# Patient Record
Sex: MALE | Race: WHITE | NOT HISPANIC OR LATINO | URBAN - METROPOLITAN AREA
[De-identification: names, ages, dates, MRNs, and addresses within clinical notes are randomized per-mention and may not be internally consistent; named-entity substitution may affect disease eponyms.]

---

## 2018-10-17 ENCOUNTER — OFFICE VISIT CONVERTED (OUTPATIENT)
Dept: FAMILY MEDICINE CLINIC | Facility: CLINIC | Age: 28
End: 2018-10-17
Attending: NURSE PRACTITIONER

## 2021-05-16 VITALS
TEMPERATURE: 98.5 F | OXYGEN SATURATION: 97 % | HEART RATE: 84 BPM | DIASTOLIC BLOOD PRESSURE: 80 MMHG | SYSTOLIC BLOOD PRESSURE: 140 MMHG | HEIGHT: 74 IN | RESPIRATION RATE: 14 BRPM | BODY MASS INDEX: 22.34 KG/M2 | WEIGHT: 174.06 LBS

## 2025-06-23 ENCOUNTER — OFFICE VISIT (OUTPATIENT)
Dept: UROLOGY | Age: 35
End: 2025-06-23
Payer: COMMERCIAL

## 2025-06-23 VITALS — BODY MASS INDEX: 22.33 KG/M2 | WEIGHT: 174 LBS | HEIGHT: 74 IN

## 2025-06-23 DIAGNOSIS — Z30.09 STERILIZATION CONSULT: Primary | ICD-10-CM

## 2025-06-23 PROCEDURE — 99203 OFFICE O/P NEW LOW 30 MIN: CPT | Performed by: UROLOGY

## 2025-06-23 NOTE — PROGRESS NOTES
Chief Complaint: Undesired fertility         History of Present Illness:  Elton Powell is a 35 y.o. male presents for counseling regarding vasectomy for permanent sterilization. The procedure was discussed with the patient. Elton Powell is aware that the procedure should be considered irreversible, although at times it can be reversed with success. Risks for the procedure including local effects of swelling, bleeding, pain, the possibility for recanalization, and continued fertility is also possible. Formation of a sperm granuloma also is a possibility. We discussed the procedure, preoperative preparation, and postoperative care. We also discussed the importance of continuing to use contraception post vasectomy, since the patient will not be sterile at that point. We stressed the importance of continuing to use contraception until a follow-up semen specimen is done that shows the absence of sperm. That specimen will normally be obtained eight weeks post-surgery. Elton Powell is voluntarily requesting the procedure and understands that if it is successful he will be unable to father children.     No anticoagulation, no previous scrotal surgery, no cardiopulmonary history    Alert and oriented x3  No acute distress  Unlabored respirations  Nontender/nondistended  Normal circumcised phallus, bilateral descended testicles without mass.  Bilateral vas deferens palpable  Grossly oriented to person place and time judgment is intact      Assessment and Plan      Consult for sterilization      Plan    Instructions  The patient will schedule a vasectomy if he desires.   Handouts were provided, vasectomy  scanned into the EMR for reference.   Vasectomy is intended to be a permanent form of contraception.   Vasectomy does not produce immediate sterility.   Following vasectomy, another form of contraception is required until vas occlusion is confirmed by post-vasectomy semen analysis (PVSA).   Even  after vas occlusion is confirmed, vasectomy is not 100% reliable in preventing pregnancy.   The risk of pregnancy after vasectomy is approximately 1 in 2,000 for men who have no sperm in the semen on post-vasectomy semen analysis showing rare non-motile sperm (RNMS).   Repeat vasectomy is necessary in <1% of vasectomies, provided that a technique for vas occlusion known to have low occlusive failure rate has been used.   Patient's should refrain from ejaculation for approximately 1 week after vasectomy.   Options for fertility after vasectomy reversal and sperm retrieval with in vitro fertilization. These options are not always successful, and are very expensive.   The rates of surgical complications such as symptomatic hematoma and infection are 1-2%  Chronic scrotal pain associated with negative impact on quality of life occurs after vasectomy in about 1-2% of men. Few of these men require additional surgery.   Other permanent and non-permanent alternatives to vasectomy are available.  Patient voiced understanding of the above statements.   Electronically identified patient education materials provided electronically    Patient has decided to schedule a vasectomy.